# Patient Record
(demographics unavailable — no encounter records)

---

## 2025-05-08 NOTE — PHYSICAL EXAM
[de-identified] : The patient is sitting comfortably in the exam room.  RIGHT leg.  -Skin is intact, no swelling, no ecchymosis -Incision clean and dry, no erythema, no signs of infection  -Range of motion 0-110 -Sensation is intact L1-S1  -5/5 EHL, FHL, TA, GS, quadriceps, hamstrings  -Foot is warm and well-perfused, palpable dorsalis pedis pulse  [de-identified] : No Xrays were taken in the office today, 5/8/25

## 2025-05-08 NOTE — DISCUSSION/SUMMARY
[de-identified] : 53-year-old woman s/p right knee I&D, approximately 2.5 weeks out  -X-ray and physical exam findings were discussed with the patient -Weightbearing as tolerated right LE -Physical therapy: gait training  -Follow up in 2 months to assess the right knee.  -All the patient's questions and concerns were addressed during this visit

## 2025-05-08 NOTE — PHYSICAL EXAM
[de-identified] : The patient is sitting comfortably in the exam room.  RIGHT leg.  -Skin is intact, no swelling, no ecchymosis -Incision clean and dry, no erythema, no signs of infection  -Range of motion 0-110 -Sensation is intact L1-S1  -5/5 EHL, FHL, TA, GS, quadriceps, hamstrings  -Foot is warm and well-perfused, palpable dorsalis pedis pulse  [de-identified] : No Xrays were taken in the office today, 5/8/25

## 2025-05-08 NOTE — HISTORY OF PRESENT ILLNESS
[de-identified] : Ms. MOST TODD is a 53 year old woman presents today for post-op appointment s/p right knee I&D on 4/20/25. Since her surgery she states she is feeling better. She notes mild pain to the right knee with flexion and extension. She is ambulating with a cane.

## 2025-05-08 NOTE — HISTORY OF PRESENT ILLNESS
[de-identified] : Ms. MOST TODD is a 53 year old woman presents today for post-op appointment s/p right knee I&D on 4/20/25. Since her surgery she states she is feeling better. She notes mild pain to the right knee with flexion and extension. She is ambulating with a cane.

## 2025-05-08 NOTE — DISCUSSION/SUMMARY
[de-identified] : 53-year-old woman s/p right knee I&D, approximately 2.5 weeks out  -X-ray and physical exam findings were discussed with the patient -Weightbearing as tolerated right LE -Physical therapy: gait training  -Follow up in 2 months to assess the right knee.  -All the patient's questions and concerns were addressed during this visit
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
## 2025-05-15 NOTE — REVIEW OF SYSTEMS
[Joint Pain] : joint pain [Nausea] : nausea [Vomiting] : vomiting [Headache] : headache [Fever] : no fever [Chills] : no chills [Vision Problems] : no vision problems [Sore Throat] : no sore throat [Chest Pain] : no chest pain [Leg Claudication] : no leg claudication [Lower Ext Edema] : no lower extremity edema [Shortness Of Breath] : no shortness of breath [Cough] : no cough [Dyspnea on Exertion] : no dyspnea on exertion [Abdominal Pain] : no abdominal pain [Diarrhea] : diarrhea [Dysuria] : no dysuria [Incontinence] : no incontinence [Joint Stiffness] : no joint stiffness [Muscle Pain] : no muscle pain [Back Pain] : no back pain [Skin Rash] : no skin rash [Dizziness] : no dizziness [Fainting] : no fainting [Anxiety] : no anxiety [Depression] : no depression
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
## 2025-05-15 NOTE — PHYSICAL EXAM
[No Acute Distress] : no acute distress [Well Nourished] : well nourished [Well Developed] : well developed [Well-Appearing] : well-appearing [Normal Sclera/Conjunctiva] : normal sclera/conjunctiva [PERRL] : pupils equal round and reactive to light [EOMI] : extraocular movements intact [Normal Oropharynx] : the oropharynx was normal [Supple] : supple [No Respiratory Distress] : no respiratory distress  [No Accessory Muscle Use] : no accessory muscle use [Clear to Auscultation] : lungs were clear to auscultation bilaterally [Normal Rate] : normal rate  [Regular Rhythm] : with a regular rhythm [No Murmur] : no murmur heard [Soft] : abdomen soft [Non Tender] : non-tender [No CVA Tenderness] : no CVA  tenderness [No Spinal Tenderness] : no spinal tenderness [Grossly Normal Strength/Tone] : grossly normal strength/tone [No Rash] : no rash [Coordination Grossly Intact] : coordination grossly intact [No Focal Deficits] : no focal deficits [Normal Affect] : the affect was normal [Normal Insight/Judgement] : insight and judgment were intact [de-identified] : right knee without any erythema. Arthorscopy sites clean dry and intact without pain to palpation. Left fingers 3-5 with decreased flexion/extension (unable to make fist). Normal sensation and cap refill.
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
## 2025-05-15 NOTE — PHYSICAL EXAM
[No Acute Distress] : no acute distress [Well Nourished] : well nourished [Well Developed] : well developed [Well-Appearing] : well-appearing [Normal Sclera/Conjunctiva] : normal sclera/conjunctiva [PERRL] : pupils equal round and reactive to light [EOMI] : extraocular movements intact [Normal Oropharynx] : the oropharynx was normal [Supple] : supple [No Respiratory Distress] : no respiratory distress  [No Accessory Muscle Use] : no accessory muscle use [Clear to Auscultation] : lungs were clear to auscultation bilaterally [Normal Rate] : normal rate  [Regular Rhythm] : with a regular rhythm [No Murmur] : no murmur heard [Soft] : abdomen soft [Non Tender] : non-tender [No CVA Tenderness] : no CVA  tenderness [No Spinal Tenderness] : no spinal tenderness [Grossly Normal Strength/Tone] : grossly normal strength/tone [No Rash] : no rash [Coordination Grossly Intact] : coordination grossly intact [No Focal Deficits] : no focal deficits [Normal Affect] : the affect was normal [Normal Insight/Judgement] : insight and judgment were intact [de-identified] : right knee without any erythema. Arthorscopy sites clean dry and intact without pain to palpation. Left fingers 3-5 with decreased flexion/extension (unable to make fist). Normal sensation and cap refill.

## 2025-06-12 NOTE — DISCUSSION/SUMMARY
[de-identified] : 53-year-old woman s/p right knee I&D, approximately 7.5 weeks out  -X-ray and physical exam findings were discussed with the patient -Weightbearing as tolerated right LE -Physical therapy: gait training and strengthening -Follow up in 2 months with x-rays of the right knee at that time. If the pain persists, we can order an MRI -All the patient's questions and concerns were addressed during this visit

## 2025-06-12 NOTE — HISTORY OF PRESENT ILLNESS
[de-identified] : Ms. MOST TODD is a 53 year old woman presents today for post-op appointment s/p right knee I&D on 4/20/25. Since her surgery she states she is feeling better. She notes pain at the anterior aspect of the right knee with full extension. She has been participating in PT 1-2 times a week.   Of note, she was receiving Ertapenem and Vancomycin via PICC line until 5/18

## 2025-06-12 NOTE — REASON FOR VISIT
[Post Operative Visit] : a post operative visit for [FreeTextEntry2] : s/p right knee I&D, DOS: 4/20/25

## 2025-06-12 NOTE — HISTORY OF PRESENT ILLNESS
[de-identified] : Ms. MOST TODD is a 53 year old woman presents today for post-op appointment s/p right knee I&D on 4/20/25. Since her surgery she states she is feeling better. She notes pain at the anterior aspect of the right knee with full extension. She has been participating in PT 1-2 times a week.   Of note, she was receiving Ertapenem and Vancomycin via PICC line until 5/18

## 2025-06-12 NOTE — PHYSICAL EXAM
[de-identified] : The patient is sitting comfortably in the exam room.  RIGHT leg.  -Skin is intact, no swelling, no ecchymosis -Incision clean and dry, no erythema, no signs of infection  -Range of motion  -Negative Lachman, negative anterior drawer, negative posterior drawer  -Negative Rabia  -Sensation is intact L1-S1  -5/5 EHL, FHL, TA, GS, quadriceps, hamstrings  -Foot is warm and well-perfused, palpable dorsalis pedis pulse  [de-identified] : No Xrays were taken in the office today, 6/12/25

## 2025-06-12 NOTE — PHYSICAL EXAM
[de-identified] : The patient is sitting comfortably in the exam room.  RIGHT leg.  -Skin is intact, no swelling, no ecchymosis -Incision clean and dry, no erythema, no signs of infection  -Range of motion  -Negative Lachman, negative anterior drawer, negative posterior drawer  -Negative Rabia  -Sensation is intact L1-S1  -5/5 EHL, FHL, TA, GS, quadriceps, hamstrings  -Foot is warm and well-perfused, palpable dorsalis pedis pulse  [de-identified] : No Xrays were taken in the office today, 6/12/25

## 2025-06-12 NOTE — DISCUSSION/SUMMARY
[de-identified] : 53-year-old woman s/p right knee I&D, approximately 7.5 weeks out  -X-ray and physical exam findings were discussed with the patient -Weightbearing as tolerated right LE -Physical therapy: gait training and strengthening -Follow up in 2 months with x-rays of the right knee at that time. If the pain persists, we can order an MRI -All the patient's questions and concerns were addressed during this visit